# Patient Record
Sex: FEMALE | Race: BLACK OR AFRICAN AMERICAN | NOT HISPANIC OR LATINO | URBAN - METROPOLITAN AREA
[De-identification: names, ages, dates, MRNs, and addresses within clinical notes are randomized per-mention and may not be internally consistent; named-entity substitution may affect disease eponyms.]

---

## 2023-12-15 ENCOUNTER — TELEPHONE ENCOUNTER (OUTPATIENT)
Dept: URBAN - METROPOLITAN AREA CLINIC 92 | Facility: CLINIC | Age: 38
End: 2023-12-15

## 2023-12-15 ENCOUNTER — OFFICE VISIT (OUTPATIENT)
Dept: URBAN - METROPOLITAN AREA CLINIC 92 | Facility: CLINIC | Age: 38
End: 2023-12-15
Payer: COMMERCIAL

## 2023-12-15 VITALS
HEART RATE: 86 BPM | SYSTOLIC BLOOD PRESSURE: 128 MMHG | HEIGHT: 64 IN | DIASTOLIC BLOOD PRESSURE: 79 MMHG | TEMPERATURE: 96.1 F | WEIGHT: 166 LBS | BODY MASS INDEX: 28.34 KG/M2

## 2023-12-15 DIAGNOSIS — D68.00 VON WILLEBRAND DISEASE: ICD-10-CM

## 2023-12-15 DIAGNOSIS — K62.5 RECTAL BLEEDING: ICD-10-CM

## 2023-12-15 DIAGNOSIS — K59.04 CHRONIC IDIOPATHIC CONSTIPATION: ICD-10-CM

## 2023-12-15 PROBLEM — 82934008: Status: ACTIVE | Noted: 2023-12-15

## 2023-12-15 PROCEDURE — 99203 OFFICE O/P NEW LOW 30 MIN: CPT

## 2023-12-15 RX ORDER — POLYETHYLENE GLYCOL 3350, SODIUM SULFATE ANHYDROUS, SODIUM BICARBONATE, SODIUM CHLORIDE, POTASSIUM CHLORIDE 236; 22.74; 6.74; 5.86; 2.97 G/4L; G/4L; G/4L; G/4L; G/4L
AS DIRECTED POWDER, FOR SOLUTION ORAL 1
Qty: 1 | Refills: 0 | OUTPATIENT
Start: 2023-12-15 | End: 2023-12-16

## 2023-12-15 NOTE — HPI-TODAY'S VISIT:
38-year-old female patient presents today due to rectal pain.  She states that symptoms have been occurring for 1 month.  Blood is both in toilet bowl and when she wipes.  She recently presented to [facility] ED due to the rectal bleeding.  Labs demonstrated hemoglobin 13, MCV 39, hematocrit 39, INR 1, PT 13.1 plts 245.  She was discharged and told to follow-up with GI.  She was also started on MiraLAX. She typically has 1 bowel movement every 1 to 3 weeks which is normal for her.  She denies any straining.  She denies any rectal pain or itching.  She started having regular bowel movements with MiraLAX daily.  She notes abdominal burning on the left side but no pain.  Denies any nausea, vomiting, fever, chills.  No upper GI complaints.  She has no family history of any GI related cancers. She does note she has a history of von Willebrand disease.

## 2024-01-04 ENCOUNTER — TELEPHONE ENCOUNTER (OUTPATIENT)
Dept: URBAN - METROPOLITAN AREA CLINIC 92 | Facility: CLINIC | Age: 39
End: 2024-01-04

## 2024-01-22 ENCOUNTER — OFFICE VISIT (OUTPATIENT)
Dept: URBAN - METROPOLITAN AREA SURGERY CENTER 16 | Facility: SURGERY CENTER | Age: 39
End: 2024-01-22

## 2024-01-23 ENCOUNTER — TELEPHONE ENCOUNTER (OUTPATIENT)
Dept: URBAN - METROPOLITAN AREA CLINIC 92 | Facility: CLINIC | Age: 39
End: 2024-01-23

## 2024-02-12 ENCOUNTER — TELEP (OUTPATIENT)
Dept: URBAN - METROPOLITAN AREA TELEHEALTH 2 | Facility: TELEHEALTH | Age: 39
End: 2024-02-12

## 2024-02-26 ENCOUNTER — LAB (OUTPATIENT)
Dept: URBAN - METROPOLITAN AREA CLINIC 4 | Facility: CLINIC | Age: 39
End: 2024-02-26
Payer: COMMERCIAL

## 2024-02-26 ENCOUNTER — COLON (OUTPATIENT)
Dept: URBAN - METROPOLITAN AREA SURGERY CENTER 16 | Facility: SURGERY CENTER | Age: 39
End: 2024-02-26

## 2024-02-26 DIAGNOSIS — D12.2 BENIGN NEOPLASM OF ASCENDING COLON: ICD-10-CM

## 2024-02-26 PROCEDURE — 88305 TISSUE EXAM BY PATHOLOGIST: CPT | Performed by: PATHOLOGY

## 2024-03-19 ENCOUNTER — TELEP (OUTPATIENT)
Dept: URBAN - METROPOLITAN AREA TELEHEALTH 2 | Facility: TELEHEALTH | Age: 39
End: 2024-03-19
Payer: COMMERCIAL

## 2024-03-19 VITALS — HEIGHT: 64 IN | BODY MASS INDEX: 28.34 KG/M2 | WEIGHT: 166 LBS

## 2024-03-19 DIAGNOSIS — K64.8 INTERNAL HEMORRHOIDS: ICD-10-CM

## 2024-03-19 DIAGNOSIS — K59.04 CHRONIC IDIOPATHIC CONSTIPATION: ICD-10-CM

## 2024-03-19 DIAGNOSIS — K62.5 RECTAL BLEEDING: ICD-10-CM

## 2024-03-19 DIAGNOSIS — Z86.010 PERSONAL HISTORY OF COLONIC POLYPS: ICD-10-CM

## 2024-03-19 PROBLEM — 428283002: Status: ACTIVE | Noted: 2024-03-19

## 2024-03-19 PROCEDURE — 99213 OFFICE O/P EST LOW 20 MIN: CPT

## 2024-03-19 RX ORDER — HYDROCORTISONE ACETATE 25 MG/1
1 SUPPOSITORY SUPPOSITORY RECTAL TWICE A DAY
Qty: 28 | Refills: 0 | OUTPATIENT
Start: 2024-03-19 | End: 2024-04-02

## 2024-03-19 RX ORDER — LINACLOTIDE 145 UG/1
1 CAPSULE AT LEAST 30 MINUTES BEFORE THE FIRST MEAL OF THE DAY ON AN EMPTY STOMACH CAPSULE, GELATIN COATED ORAL ONCE A DAY
Qty: 90 | Refills: 1 | OUTPATIENT
Start: 2024-03-19 | End: 2024-09-15

## 2024-03-19 NOTE — HPI-TODAY'S VISIT:
12/2023 38-year-old female patient presents today due to rectal pain.  She states that symptoms have been occurring for 1 month.  Blood is both in toilet bowl and when she wipes.  She recently presented to [facility] ED due to the rectal bleeding.  Labs demonstrated hemoglobin 13, MCV 39, hematocrit 39, INR 1, PT 13.1 plts 245.  She was discharged and told to follow-up with GI.  She was also started on MiraLAX. She typically has 1 bowel movement every 1 to 3 weeks which is normal for her.  She denies any straining.  She denies any rectal pain or itching.  She started having regular bowel movements with MiraLAX daily.  She notes abdominal burning on the left side but no pain.  Denies any nausea, vomiting, fever, chills.  No upper GI complaints.  She has no family history of any GI related cancers. She does note she has a history of von Willebrand disease.  3/19/24 Colonoscopy 2-26/24 demonstrated nonthrombosed internal hemorrhoids, diminutive tubular adenoma recall 5 years  She has relocated to [facility] for about 3 months for her job.  No more rectal bleeding. Constipation and MiraLAX not working anymore. Now has BM every 3 days with MiraLAX.